# Patient Record
Sex: MALE | Race: BLACK OR AFRICAN AMERICAN
[De-identification: names, ages, dates, MRNs, and addresses within clinical notes are randomized per-mention and may not be internally consistent; named-entity substitution may affect disease eponyms.]

---

## 2019-06-27 ENCOUNTER — HOSPITAL ENCOUNTER (EMERGENCY)
Dept: HOSPITAL 61 - ER | Age: 24
Discharge: ANOTHER HOSPITAL | End: 2019-06-27
Payer: SELF-PAY

## 2019-06-27 VITALS — HEIGHT: 70 IN | WEIGHT: 247 LBS | BODY MASS INDEX: 35.36 KG/M2

## 2019-06-27 VITALS — SYSTOLIC BLOOD PRESSURE: 127 MMHG | DIASTOLIC BLOOD PRESSURE: 65 MMHG
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
## 2019-06-27 DIAGNOSIS — S02.40CA: ICD-10-CM
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
## 2019-06-27 DIAGNOSIS — S02.81XA: Primary | ICD-10-CM

## 2019-06-27 DIAGNOSIS — S02.40EA: ICD-10-CM

## 2019-06-27 DIAGNOSIS — S01.411A: ICD-10-CM

## 2019-06-27 PROCEDURE — 99285 EMERGENCY DEPT VISIT HI MDM: CPT

## 2019-06-27 PROCEDURE — 70486 CT MAXILLOFACIAL W/O DYE: CPT

## 2019-06-27 PROCEDURE — 70450 CT HEAD/BRAIN W/O DYE: CPT

## 2019-06-27 PROCEDURE — 90715 TDAP VACCINE 7 YRS/> IM: CPT

## 2019-06-27 PROCEDURE — 90471 IMMUNIZATION ADMIN: CPT

## 2019-06-27 PROCEDURE — 12011 RPR F/E/E/N/L/M 2.5 CM/<: CPT
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
